# Patient Record
Sex: FEMALE | Race: WHITE
[De-identification: names, ages, dates, MRNs, and addresses within clinical notes are randomized per-mention and may not be internally consistent; named-entity substitution may affect disease eponyms.]

---

## 2018-10-09 ENCOUNTER — HOSPITAL ENCOUNTER (OUTPATIENT)
Dept: HOSPITAL 39 - GMAM | Age: 63
End: 2018-10-09
Attending: FAMILY MEDICINE
Payer: COMMERCIAL

## 2018-10-09 DIAGNOSIS — Z00.00: Primary | ICD-10-CM

## 2020-07-02 ENCOUNTER — HOSPITAL ENCOUNTER (OUTPATIENT)
Dept: HOSPITAL 39 - US | Age: 65
End: 2020-07-02
Attending: FAMILY MEDICINE
Payer: COMMERCIAL

## 2020-07-02 DIAGNOSIS — N28.9: ICD-10-CM

## 2020-07-02 DIAGNOSIS — R10.13: ICD-10-CM

## 2020-07-02 DIAGNOSIS — K82.8: Primary | ICD-10-CM

## 2020-07-03 NOTE — US
EXAM DESCRIPTION: 

Gall Bladder: ULTRASOUND.



CLINICAL HISTORY: 

EPIGASTRIC PN



COMPARISON: 

None.



TECHNIQUE: 

Transabdominal scanning: Gray-scale and Doppler modes..

Gallbladder contracted. Patient was nonfasting.



FINDINGS: 

Gallbladder: Small a partially contracted. No echogenic stones or

sludge. No fluid around the gallbladder. No wall thickening. 3

mm. Non-tender with transducer pressure.

Common bile duct: caliber 4.0 mm within normal limits. 

Liver:  Heterogeneously increased. echogenicity; contour liver

capsule smooth where seen. No fluid around the liver.

Intrahepatic biliary ducts normal caliber.  Doppler color flow

portal vein shows turbulence and mixing. Caliber at jack hepatis

9 mm..  Long axis right lobe 14.2 cm.

Pancreas: normal size Normal echogenicity. Duct not seen.

Aorta: Normal caliber 1.7 cm. 

Right kidney: long axis is 10.6 cm; volume 90.5 mL. No echogenic

stones or hydronephrosis. Normal vascularity. Minimal decrease in

cortical thickness and echogenicity equal to the liver..



IMPRESSION: 

1. Gallbladder contracted with patient not fasting. Minimal wall

thickening is spurious due to contraction. No fluid in nontender

with pressure. Common bile duct normal caliber.

2. Liver normal size and heterogeneously steatosis. Portal venous

flow showed mixing but portal vein is not dilated. Correlate with

clinical findings. Smooth capsule with no ascites. Pancreas is

negative.

3. Cortical thickness and echogenicity is most likely related to

aging.



Electronically signed by:  Delbert Aguirre MD  7/3/2020 10:38 AM CDT

Workstation: [id number]

## 2020-07-07 ENCOUNTER — HOSPITAL ENCOUNTER (OUTPATIENT)
Dept: HOSPITAL 39 - GMAM | Age: 65
End: 2020-07-07
Attending: FAMILY MEDICINE
Payer: COMMERCIAL

## 2020-07-07 DIAGNOSIS — R10.13: Primary | ICD-10-CM
